# Patient Record
Sex: FEMALE | Employment: FULL TIME | URBAN - METROPOLITAN AREA
[De-identification: names, ages, dates, MRNs, and addresses within clinical notes are randomized per-mention and may not be internally consistent; named-entity substitution may affect disease eponyms.]

---

## 2021-10-28 ENCOUNTER — CLINICAL SUPPORT (OUTPATIENT)
Dept: OTHER | Facility: CLINIC | Age: 46
End: 2021-10-28
Payer: COMMERCIAL

## 2021-10-28 DIAGNOSIS — Z00.8 ENCOUNTER FOR OTHER GENERAL EXAMINATION: ICD-10-CM

## 2021-10-28 PROCEDURE — 82947 PR  ASSAY QUANTITATIVE,BLOOD GLUCOSE: ICD-10-PCS | Mod: QW,S$GLB,, | Performed by: INTERNAL MEDICINE

## 2021-10-28 PROCEDURE — 99401 PREV MED CNSL INDIV APPRX 15: CPT | Mod: 25,S$GLB,, | Performed by: INTERNAL MEDICINE

## 2021-10-28 PROCEDURE — 82947 ASSAY GLUCOSE BLOOD QUANT: CPT | Mod: QW,S$GLB,, | Performed by: INTERNAL MEDICINE

## 2021-10-28 PROCEDURE — 80061 LIPID PANEL: CPT | Mod: QW,S$GLB,, | Performed by: INTERNAL MEDICINE

## 2021-10-28 PROCEDURE — 80061 PR  LIPID PANEL: ICD-10-PCS | Mod: QW,S$GLB,, | Performed by: INTERNAL MEDICINE

## 2021-10-28 PROCEDURE — 99401 PR PREVENT COUNSEL,INDIV,15 MIN: ICD-10-PCS | Mod: 25,S$GLB,, | Performed by: INTERNAL MEDICINE

## 2021-10-29 VITALS — HEIGHT: 59 IN

## 2021-10-29 LAB
GLUCOSE SERPL-MCNC: 93 MG/DL (ref 60–140)
HDLC SERPL-MCNC: 52 MG/DL
POC CHOLESTEROL, LDL (DOCK): 104 MG/DL
POC CHOLESTEROL, TOTAL: 198 MG/DL
TRIGL SERPL-MCNC: 214 MG/DL

## 2024-07-22 ENCOUNTER — TELEPHONE (OUTPATIENT)
Dept: SURGERY | Facility: CLINIC | Age: 49
End: 2024-07-22
Payer: COMMERCIAL

## 2024-07-22 ENCOUNTER — HOSPITAL ENCOUNTER (EMERGENCY)
Facility: HOSPITAL | Age: 49
Discharge: HOME | End: 2024-07-22
Attending: EMERGENCY MEDICINE
Payer: COMMERCIAL

## 2024-07-22 VITALS
OXYGEN SATURATION: 98 % | BODY MASS INDEX: 35.18 KG/M2 | TEMPERATURE: 98 F | RESPIRATION RATE: 20 BRPM | HEART RATE: 69 BPM | WEIGHT: 174.19 LBS | SYSTOLIC BLOOD PRESSURE: 184 MMHG | DIASTOLIC BLOOD PRESSURE: 87 MMHG

## 2024-07-22 DIAGNOSIS — R10.13 EPIGASTRIC PAIN: Primary | ICD-10-CM

## 2024-07-22 DIAGNOSIS — K42.9 UMBILICAL HERNIA WITHOUT OBSTRUCTION AND WITHOUT GANGRENE: ICD-10-CM

## 2024-07-22 LAB
ALBUMIN SERPL BCP-MCNC: 4.3 G/DL (ref 3.5–5.2)
ALP SERPL-CCNC: 89 U/L (ref 55–135)
ALT SERPL W/O P-5'-P-CCNC: 35 U/L (ref 10–44)
ANION GAP SERPL CALC-SCNC: 12 MMOL/L (ref 8–16)
AST SERPL-CCNC: 27 U/L (ref 10–40)
BASOPHILS # BLD AUTO: 0.02 K/UL (ref 0–0.2)
BASOPHILS NFR BLD: 0.2 % (ref 0–1.9)
BILIRUB SERPL-MCNC: 0.2 MG/DL (ref 0.1–1)
BUN SERPL-MCNC: 13 MG/DL (ref 6–20)
CALCIUM SERPL-MCNC: 10.3 MG/DL (ref 8.7–10.5)
CHLORIDE SERPL-SCNC: 105 MMOL/L (ref 95–110)
CO2 SERPL-SCNC: 25 MMOL/L (ref 23–29)
CREAT SERPL-MCNC: 0.7 MG/DL (ref 0.5–1.4)
DIFFERENTIAL METHOD BLD: NORMAL
EOSINOPHIL # BLD AUTO: 0.1 K/UL (ref 0–0.5)
EOSINOPHIL NFR BLD: 1.3 % (ref 0–8)
ERYTHROCYTE [DISTWIDTH] IN BLOOD BY AUTOMATED COUNT: 12 % (ref 11.5–14.5)
EST. GFR  (NO RACE VARIABLE): >60 ML/MIN/1.73 M^2
GLUCOSE SERPL-MCNC: 105 MG/DL (ref 70–110)
HCT VFR BLD AUTO: 41.3 % (ref 37–48.5)
HGB BLD-MCNC: 13.6 G/DL (ref 12–16)
IMM GRANULOCYTES # BLD AUTO: 0.03 K/UL (ref 0–0.04)
IMM GRANULOCYTES NFR BLD AUTO: 0.4 % (ref 0–0.5)
LIPASE SERPL-CCNC: 49 U/L (ref 4–60)
LYMPHOCYTES # BLD AUTO: 3.1 K/UL (ref 1–4.8)
LYMPHOCYTES NFR BLD: 37.3 % (ref 18–48)
MCH RBC QN AUTO: 28.6 PG (ref 27–31)
MCHC RBC AUTO-ENTMCNC: 32.9 G/DL (ref 32–36)
MCV RBC AUTO: 87 FL (ref 82–98)
MONOCYTES # BLD AUTO: 0.6 K/UL (ref 0.3–1)
MONOCYTES NFR BLD: 7.6 % (ref 4–15)
NEUTROPHILS # BLD AUTO: 4.4 K/UL (ref 1.8–7.7)
NEUTROPHILS NFR BLD: 53.2 % (ref 38–73)
NRBC BLD-RTO: 0 /100 WBC
PLATELET # BLD AUTO: 225 K/UL (ref 150–450)
PMV BLD AUTO: 10.7 FL (ref 9.2–12.9)
POTASSIUM SERPL-SCNC: 4.4 MMOL/L (ref 3.5–5.1)
PROT SERPL-MCNC: 7.7 G/DL (ref 6–8.4)
RBC # BLD AUTO: 4.75 M/UL (ref 4–5.4)
SODIUM SERPL-SCNC: 142 MMOL/L (ref 136–145)
WBC # BLD AUTO: 8.18 K/UL (ref 3.9–12.7)

## 2024-07-22 PROCEDURE — 83690 ASSAY OF LIPASE: CPT | Performed by: NURSE PRACTITIONER

## 2024-07-22 PROCEDURE — 99285 EMERGENCY DEPT VISIT HI MDM: CPT | Mod: 25

## 2024-07-22 PROCEDURE — 85025 COMPLETE CBC W/AUTO DIFF WBC: CPT | Performed by: NURSE PRACTITIONER

## 2024-07-22 PROCEDURE — 80053 COMPREHEN METABOLIC PANEL: CPT | Performed by: NURSE PRACTITIONER

## 2024-07-22 NOTE — TELEPHONE ENCOUNTER
Contacting patient to get her scheduled. Patient had recent ER visit with findings of hiatal and umbilical hernia. Patient scheduled for 7/25/24 at 11:40am        ----- Message from Teresa Hinds sent at 7/22/2024  1:24 PM CDT -----  Patient stated she went to ER today and have a small hernia. She was advised to follow up with general surgery. Call back number is [phone]. Thx.EL

## 2024-07-22 NOTE — ED PROVIDER NOTES
Encounter Date: 7/22/2024       History     Chief Complaint   Patient presents with    Abdominal Pain     Pt states she is having abd pain in the middle of her abd. Pt states she is also feeling faint. Denies N/V/D     49-year-old female with complaint of mid abdominal pain over the past 2 weeks.  Patient reports pain has worsening.  Patient reports no diarrhea but reports nausea.  Patient denies fever or chills.        Review of patient's allergies indicates:  No Known Allergies  No past medical history on file.  No past surgical history on file.  No family history on file.     Review of Systems   Constitutional:  Negative for fever.   HENT:  Negative for sore throat.    Respiratory:  Negative for shortness of breath.    Cardiovascular:  Negative for chest pain.   Gastrointestinal:  Positive for abdominal pain. Negative for nausea.   Genitourinary:  Negative for dysuria.   Musculoskeletal:  Negative for back pain.   Skin:  Negative for rash.   Neurological:  Negative for weakness.   Hematological:  Does not bruise/bleed easily.       Physical Exam     Initial Vitals [07/22/24 0910]   BP Pulse Resp Temp SpO2   (!) 166/81 80 16 97.8 °F (36.6 °C) 98 %      MAP       --         Physical Exam    Nursing note and vitals reviewed.  Constitutional: She appears well-developed and well-nourished.   HENT:   Head: Normocephalic and atraumatic.   Eyes: Conjunctivae and EOM are normal. Pupils are equal, round, and reactive to light.   Neck: Neck supple.   Normal range of motion.  Cardiovascular:  Normal rate, regular rhythm, normal heart sounds and intact distal pulses.           Pulmonary/Chest: Breath sounds normal.   Abdominal: Abdomen is soft. There is abdominal tenderness. There is no rebound and no guarding.   Musculoskeletal:         General: Normal range of motion.      Cervical back: Normal range of motion and neck supple.     Neurological: She is alert and oriented to person, place, and time. She has normal strength  and normal reflexes.   Skin: Skin is warm and dry.   Psychiatric: She has a normal mood and affect. Her behavior is normal. Thought content normal.         ED Course   Procedures  Labs Reviewed   CBC W/ AUTO DIFFERENTIAL       Result Value    WBC 8.18      RBC 4.75      Hemoglobin 13.6      Hematocrit 41.3      MCV 87      MCH 28.6      MCHC 32.9      RDW 12.0      Platelets 225      MPV 10.7      Immature Granulocytes 0.4      Gran # (ANC) 4.4      Immature Grans (Abs) 0.03      Lymph # 3.1      Mono # 0.6      Eos # 0.1      Baso # 0.02      nRBC 0      Gran % 53.2      Lymph % 37.3      Mono % 7.6      Eosinophil % 1.3      Basophil % 0.2      Differential Method Automated     COMPREHENSIVE METABOLIC PANEL    Sodium 142      Potassium 4.4      Chloride 105      CO2 25      Glucose 105      BUN 13      Creatinine 0.7      Calcium 10.3      Total Protein 7.7      Albumin 4.3      Total Bilirubin 0.2      Alkaline Phosphatase 89      AST 27      ALT 35      eGFR >60      Anion Gap 12     LIPASE    Lipase 49     URINALYSIS, REFLEX TO URINE CULTURE          Imaging Results              CT Abdomen Pelvis  Without Contrast (Final result)  Result time 07/22/24 11:08:11      Final result by Da Wagner MD (07/22/24 11:08:11)                   Impression:      1.  Negative for acute process involving the abdomen or pelvis.  Normal appendix.  Negative for renal stone disease or hydronephrosis.  Negative for inflammatory changes.  Negative for free air.    2.  The urinary bladder is mildly distended.  Bladder outlet obstruction symptoms?  Clinical correlation is advised.    3.  Middle lobe scarring or atelectasis.  Small hiatal hernia.  Fatty infiltration of the liver.  9 mm nabothian cyst.  Fat filled umbilical hernia.  Age-appropriate degenerative changes of the spine and pelvis.  Other nonemergent findings as described above.    All CT scans at this facility are performed  using dose modulation techniques as  appropriate to performed exam including the following:  automated exposure control; adjustment of mA and/or kV according to the patients size (this includes techniques or standardized protocols for targeted exams where dose is matched to indication/reason for exam: i.e. extremities or head);  iterative reconstruction technique.      Electronically signed by: Da Wagner MD  Date:    07/22/2024  Time:    11:08               Narrative:    EXAMINATION:  CT ABDOMEN PELVIS WITHOUT CONTRAST, multiplanar reconstructions    CLINICAL HISTORY:  LLQ abdominal pain;    TECHNIQUE:  Axial images through the abdomen and pelvis were obtained without the use of IV contrast.  Sagittal and coronal reconstructions are provided for review.  Oral contrast was not utilized.    COMPARISON:  None    FINDINGS:  LUNG BASES: Middle lobe scarring or atelectasis.  Lung bases are otherwise clear.  Negative for pleural or pericardial effusions. The distal esophagus is normal.  Small hiatal hernia.  Negative for coronary artery calcifications.    ABDOMEN: Fatty infiltration of the liver with fatty sparing near the gallbladder fossa.  The liver, spleen and gallbladder otherwise appear normal.  The pancreas is unremarkable.  Kidneys and adrenal glands are normal.    Negative for adenopathy or ascites noted within the abdomen or pelvis.  Minimal amorphous inflammatory changes involve the root of the mesentery with multiple subcentimeter lymph nodes, nonspecific.  Negative for vascular abnormalities.    The bowel appears normal. Normal appendix.  Appropriate stool.  Negative for free air.    PELVIS: The urinary bladder is mildly distended.  The uterus is tilted to the left.  9 mm nabothian cyst.  The rest of the female pelvic organs are normal. There are pelvic phleboliths.    Fat filled umbilical hernia.  The abdominal wall is otherwise intact.    No significant osseous abnormality is identified.  Negative for significant spinal canal stenosis. Mild  multilevel marginal spondylosis.  Convex-left curvature of the thoracolumbar junction.  Vacuum phenomenon of the sacroiliac joints.  Mild degenerative changes of the pubic symphysis.    Negative for groin adenopathy.                                    Imaging Results              CT Abdomen Pelvis  Without Contrast (Final result)  Result time 07/22/24 11:08:11      Final result by Da Wagner MD (07/22/24 11:08:11)                   Impression:      1.  Negative for acute process involving the abdomen or pelvis.  Normal appendix.  Negative for renal stone disease or hydronephrosis.  Negative for inflammatory changes.  Negative for free air.    2.  The urinary bladder is mildly distended.  Bladder outlet obstruction symptoms?  Clinical correlation is advised.    3.  Middle lobe scarring or atelectasis.  Small hiatal hernia.  Fatty infiltration of the liver.  9 mm nabothian cyst.  Fat filled umbilical hernia.  Age-appropriate degenerative changes of the spine and pelvis.  Other nonemergent findings as described above.    All CT scans at this facility are performed  using dose modulation techniques as appropriate to performed exam including the following:  automated exposure control; adjustment of mA and/or kV according to the patients size (this includes techniques or standardized protocols for targeted exams where dose is matched to indication/reason for exam: i.e. extremities or head);  iterative reconstruction technique.      Electronically signed by: Da Wagner MD  Date:    07/22/2024  Time:    11:08               Narrative:    EXAMINATION:  CT ABDOMEN PELVIS WITHOUT CONTRAST, multiplanar reconstructions    CLINICAL HISTORY:  LLQ abdominal pain;    TECHNIQUE:  Axial images through the abdomen and pelvis were obtained without the use of IV contrast.  Sagittal and coronal reconstructions are provided for review.  Oral contrast was not utilized.    COMPARISON:  None    FINDINGS:  LUNG BASES: Middle lobe  scarring or atelectasis.  Lung bases are otherwise clear.  Negative for pleural or pericardial effusions. The distal esophagus is normal.  Small hiatal hernia.  Negative for coronary artery calcifications.    ABDOMEN: Fatty infiltration of the liver with fatty sparing near the gallbladder fossa.  The liver, spleen and gallbladder otherwise appear normal.  The pancreas is unremarkable.  Kidneys and adrenal glands are normal.    Negative for adenopathy or ascites noted within the abdomen or pelvis.  Minimal amorphous inflammatory changes involve the root of the mesentery with multiple subcentimeter lymph nodes, nonspecific.  Negative for vascular abnormalities.    The bowel appears normal. Normal appendix.  Appropriate stool.  Negative for free air.    PELVIS: The urinary bladder is mildly distended.  The uterus is tilted to the left.  9 mm nabothian cyst.  The rest of the female pelvic organs are normal. There are pelvic phleboliths.    Fat filled umbilical hernia.  The abdominal wall is otherwise intact.    No significant osseous abnormality is identified.  Negative for significant spinal canal stenosis. Mild multilevel marginal spondylosis.  Convex-left curvature of the thoracolumbar junction.  Vacuum phenomenon of the sacroiliac joints.  Mild degenerative changes of the pubic symphysis.    Negative for groin adenopathy.                                    Labs Reviewed   CBC W/ AUTO DIFFERENTIAL       Result Value    WBC 8.18      RBC 4.75      Hemoglobin 13.6      Hematocrit 41.3      MCV 87      MCH 28.6      MCHC 32.9      RDW 12.0      Platelets 225      MPV 10.7      Immature Granulocytes 0.4      Gran # (ANC) 4.4      Immature Grans (Abs) 0.03      Lymph # 3.1      Mono # 0.6      Eos # 0.1      Baso # 0.02      nRBC 0      Gran % 53.2      Lymph % 37.3      Mono % 7.6      Eosinophil % 1.3      Basophil % 0.2      Differential Method Automated     COMPREHENSIVE METABOLIC PANEL    Sodium 142      Potassium  4.4      Chloride 105      CO2 25      Glucose 105      BUN 13      Creatinine 0.7      Calcium 10.3      Total Protein 7.7      Albumin 4.3      Total Bilirubin 0.2      Alkaline Phosphatase 89      AST 27      ALT 35      eGFR >60      Anion Gap 12     LIPASE    Lipase 49     URINALYSIS, REFLEX TO URINE CULTURE          Medications - No data to display  Medical Decision Making  Amount and/or Complexity of Data Reviewed  Labs: ordered.  Radiology: ordered.       11:40 AM  Patient resting comfortably.  No abdominal tenderness on exam.  Patient told to return for any worsening.Pt denies any urinary complaints.                                Clinical Impression:  Final diagnoses:  [R10.13] Epigastric pain (Primary)  [K42.9] Umbilical hernia without obstruction and without gangrene          ED Disposition Condition    Discharge Stable          ED Prescriptions    None       Follow-up Information       Follow up With Specialties Details Why Contact Info    [facility]  Schedule an appointment as soon as possible for a visit in 2 days  [phone]             Yosi Tripathi NP  07/22/24 [id number]

## 2024-07-25 ENCOUNTER — OFFICE VISIT (OUTPATIENT)
Dept: SURGERY | Facility: CLINIC | Age: 49
End: 2024-07-25
Payer: COMMERCIAL

## 2024-07-25 VITALS
BODY MASS INDEX: 35.67 KG/M2 | HEART RATE: 77 BPM | DIASTOLIC BLOOD PRESSURE: 89 MMHG | SYSTOLIC BLOOD PRESSURE: 136 MMHG | WEIGHT: 176.56 LBS

## 2024-07-25 DIAGNOSIS — F41.9 ANXIETY: Primary | ICD-10-CM

## 2024-07-25 DIAGNOSIS — R10.9 ABDOMINAL PAIN, UNSPECIFIED ABDOMINAL LOCATION: ICD-10-CM

## 2024-07-25 PROBLEM — Z87.442 HISTORY OF KIDNEY STONES: Status: ACTIVE | Noted: 2022-02-21

## 2024-07-25 PROBLEM — E66.9 CLASS 2 OBESITY IN ADULT: Status: ACTIVE | Noted: 2024-05-10

## 2024-07-25 PROBLEM — E66.812 CLASS 2 OBESITY IN ADULT: Status: ACTIVE | Noted: 2024-05-10

## 2024-07-25 PROCEDURE — 99203 OFFICE O/P NEW LOW 30 MIN: CPT | Mod: S$GLB,,, | Performed by: SURGERY

## 2024-07-25 PROCEDURE — 3079F DIAST BP 80-89 MM HG: CPT | Mod: CPTII,S$GLB,, | Performed by: SURGERY

## 2024-07-25 PROCEDURE — 1159F MED LIST DOCD IN RCRD: CPT | Mod: CPTII,S$GLB,, | Performed by: SURGERY

## 2024-07-25 PROCEDURE — 99999 PR PBB SHADOW E&M-EST. PATIENT-LVL III: CPT | Mod: PBBFAC,,, | Performed by: SURGERY

## 2024-07-25 PROCEDURE — 3008F BODY MASS INDEX DOCD: CPT | Mod: CPTII,S$GLB,, | Performed by: SURGERY

## 2024-07-25 PROCEDURE — 3075F SYST BP GE 130 - 139MM HG: CPT | Mod: CPTII,S$GLB,, | Performed by: SURGERY

## 2024-07-25 RX ORDER — ESCITALOPRAM OXALATE 10 MG/1
1 TABLET ORAL EVERY MORNING
COMMUNITY
Start: 2024-05-10

## 2024-07-25 RX ORDER — CLONAZEPAM 0.25 MG/1
0.25 TABLET, ORALLY DISINTEGRATING ORAL 3 TIMES DAILY PRN
COMMUNITY
Start: 2024-07-24

## 2024-07-25 NOTE — PATIENT INSTRUCTIONS
I would not recommend any surgical intervention.      You can try using Prevacid over-the-counter.      You should follow up with your gastroenterologist at the [facility].      My recommendation is that you talk to your gastroenterologist about a upper endoscopy    If we can be of any further assist this please let us know      Our office phone numbers are  [phone] and

## 2024-07-25 NOTE — PROGRESS NOTES
Patient ID: Tiara Johnston is a 49 y.o. female.    Chief Complaint: Hernia (Umbilical)      HPI:  49-year-old female who started some probiotics about 2 weeks ago.  After starting the she had a painful bloated feeling of the central abdomen.  She went to the emergency room at [facility].  A CT scan was done that showed a small hiatal in his small umbilical hernia.      Patient states she still has some fullness in her central abdomen.  She was not have any significant pain.  She was not have any nausea or vomiting.  She was not notice any bulging.  She has stop the probe biotic.      She was scheduled to see her gastroenterologist at the [facility] next month.      Colonoscopy in 2023.  Pathology results are available and listed below    She was accompanied by her mother    Review of Systems   Constitutional:  Negative for appetite change, chills, fatigue, fever and unexpected weight change.   HENT:  Negative for hearing loss and rhinorrhea.    Eyes:  Negative for visual disturbance.   Respiratory:  Negative for apnea, cough, shortness of breath and wheezing.    Cardiovascular:  Negative for chest pain and palpitations.   Gastrointestinal:  Positive for abdominal distention. Negative for abdominal pain (previously), blood in stool, constipation, diarrhea, nausea and vomiting.   Genitourinary:  Negative for dysuria, frequency and urgency.   Musculoskeletal:  Negative for arthralgias and neck pain.   Skin:  Negative for rash.   Neurological:  Negative for seizures, weakness, numbness and headaches.   Hematological:  Negative for adenopathy. Does not bruise/bleed easily.   Psychiatric/Behavioral:  Negative for hallucinations. The patient is not nervous/anxious.      Current Outpatient Medications   Medication Sig Dispense Refill    clonazePAM (KLONOPIN) 0.25 MG TbDL Take 0.25 mg by mouth 3 (three) times daily as needed.      EScitalopram oxalate (LEXAPRO) 10 MG tablet Take 1 tablet by mouth  every morning.       No current facility-administered medications for this visit.       Review of patient's allergies indicates:  No Known Allergies    No past medical history on file.    No past surgical history on file.    Social History     Socioeconomic History    Marital status: Single   Tobacco Use    Smoking status: Never     Passive exposure: Never    Smokeless tobacco: Never       Vitals:    07/25/24 1132   BP: 136/89   Pulse: 77       Physical Exam  Constitutional:       Appearance: She is well-developed. She is obese.   HENT:      Head: Normocephalic.   Eyes:      Pupils: Pupils are equal, round, and reactive to light.   Neck:      Thyroid: No thyromegaly.      Vascular: No JVD.      Trachea: No tracheal deviation.   Cardiovascular:      Rate and Rhythm: Normal rate and regular rhythm.      Heart sounds: Normal heart sounds.   Pulmonary:      Breath sounds: Normal breath sounds. No wheezing.   Abdominal:      General: Bowel sounds are normal. There is no distension.      Palpations: Abdomen is soft. Abdomen is not rigid. There is no mass.      Tenderness: There is no abdominal tenderness. There is no guarding or rebound.      Comments: Obese.      No evidence of any palpable abdominal wall hernias or umbilical hernias   Musculoskeletal:         General: Normal range of motion.   Lymphadenopathy:      Cervical: No cervical adenopathy.   Skin:     General: Skin is warm and dry.      Findings: No erythema or rash.   Neurological:      Mental Status: She is oriented to person, place, and time.   Psychiatric:         Mood and Affect: Mood normal.         Behavior: Behavior normal.         Thought Content: Thought content normal.         Judgment: Judgment normal.     Pathology results from colonoscopy    [facility] - 07/18/2023 11:47 AM CDT   SEE DETAILS  Clinical Data: Screening.                                                FINAL PATHOLOGIC DIAGNOSIS      1. Transverse colon polyp,  "polypectomy:                                   - Tubular adenoma                                                     - Negative for high grade dysplasia or malignancy                    2. Ascending colon polyp, polypectomy:                                   - Sessile serrated lesion (polyp)                                     - Negative for adenomatous dysplasia or malignancy                    3. Sigmoid colon polyp, polypectomy:                                     - Tubular adenoma                                                     - Negative for high grade dysplasia or malignancy                                                        Sanjuanita Stewart M.D.                                              [facility] Pathologist, Electronic Signature      SPECIMEN(S) SUBMITTED: GROSS DESCRIPTION                                  1. Transverse colon polyp: In formalin labeled "transverse colon         polyp" is one 0.4 cm fragment of tan mucosa; entirely submitted       in 1 cassette.                                                        2. Ascending colon polyp: In formalin labeled "ascending colon           polyp" are multiple fragments of tan mucosa aggregating to 0.8         cm; entirely submitted in 1 cassette.                                3. Sigmoid colon polyp: In formalin labeled "sigmoid colon polyp"         are three fragments of tan mucosa aggregating to 1 cm; entirely       submitted in 1 cassette. BMW/cbt           CT scan results    CT Abdomen Pelvis  Without Contrast  Order: [id number]  Status: Final result       Visible to patient: Yes (seen)       Next appt: None    0 Result Notes  Details    Reading Physician Reading Date Result Priority   Da Wagner MD  [phone] 7/22/2024 STAT     Narrative & Impression  EXAMINATION:  CT ABDOMEN PELVIS WITHOUT CONTRAST, multiplanar reconstructions     CLINICAL HISTORY:  LLQ abdominal pain;     TECHNIQUE:  Axial images through the abdomen and pelvis were " obtained without the use of IV contrast.  Sagittal and coronal reconstructions are provided for review.  Oral contrast was not utilized.     COMPARISON:  None     FINDINGS:  LUNG BASES: Middle lobe scarring or atelectasis.  Lung bases are otherwise clear.  Negative for pleural or pericardial effusions. The distal esophagus is normal.  Small hiatal hernia.  Negative for coronary artery calcifications.     ABDOMEN: Fatty infiltration of the liver with fatty sparing near the gallbladder fossa.  The liver, spleen and gallbladder otherwise appear normal.  The pancreas is unremarkable.  Kidneys and adrenal glands are normal.     Negative for adenopathy or ascites noted within the abdomen or pelvis.  Minimal amorphous inflammatory changes involve the root of the mesentery with multiple subcentimeter lymph nodes, nonspecific.  Negative for vascular abnormalities.     The bowel appears normal. Normal appendix.  Appropriate stool.  Negative for free air.     PELVIS: The urinary bladder is mildly distended.  The uterus is tilted to the left.  9 mm nabothian cyst.  The rest of the female pelvic organs are normal. There are pelvic phleboliths.     Fat filled umbilical hernia.  The abdominal wall is otherwise intact.     No significant osseous abnormality is identified.  Negative for significant spinal canal stenosis. Mild multilevel marginal spondylosis.  Convex-left curvature of the thoracolumbar junction.  Vacuum phenomenon of the sacroiliac joints.  Mild degenerative changes of the pubic symphysis.     Negative for groin adenopathy.     Impression:     1.  Negative for acute process involving the abdomen or pelvis.  Normal appendix.  Negative for renal stone disease or hydronephrosis.  Negative for inflammatory changes.  Negative for free air.     2.  The urinary bladder is mildly distended.  Bladder outlet obstruction symptoms?  Clinical correlation is advised.     3.  Middle lobe scarring or atelectasis.  Small hiatal  hernia.  Fatty infiltration of the liver.  9 mm nabothian cyst.  Fat filled umbilical hernia.  Age-appropriate degenerative changes of the spine and pelvis.  Other nonemergent findings as described above.     All CT scans at this facility are performed  using dose modulation techniques as appropriate to performed exam including the following:  automated exposure control; adjustment of mA and/or kV according to the patients size (this includes techniques or standardized protocols for targeted exams where dose is matched to indication/reason for exam: i.e. extremities or head);  iterative reconstruction technique.        Electronically signed by:Da Wagner MD  Date:                                            07/22/2024  Time:                                           11:08                                 Assessment & Plan:    Umbilical hernia.  This is not clinically relevant at it was nonpalpable and I do not believe is the cause of her pain   Hiatal hernia.  This is very small.  It was possible that she has gastroesophageal reflux disease    Upon review of the CT scan there is no need for surgical intervention      Patient should follow up with her gastroenterologist that the [facility] with consideration given to upper endoscopy.      She was instructed to returned to the emergency room if she has an increasing her pain.  If she does returned to the emergency room a CT scan should be repeated with IV and oral contrast.      Continued follow up in care through her providers at the [facility]

## 2024-07-25 NOTE — LETTER
July 25, 2024        Rip Arango MD  [address]  [place] [address]             [facility] [address]  [address] [address] [address]  Phone: [phone]  Fax: [phone]   Patient: Tiara Johnston   MR Number: [id number]   YOB: 1975   Date of Visit: 7/25/2024       Dear Dr. Arango:    Thank you for referring Tiara Johnston to me for evaluation. Attached you will find relevant portions of my assessment and plan of care.    If you have questions, please do not hesitate to call me. I look forward to following Tiara Johnston along with you.    The patient was seen at [facility].  The findings on her CT scan are likely not the cause of her abdominal pain.  No surgical intervention was recommended.      She should follow up with her gastroenterologist with the [facility] with consideration being given to upper endoscopy    Sincerely,          Po Barth MD              CC    No Recipients    Enclosure

## 2025-05-18 ENCOUNTER — HOSPITAL ENCOUNTER (EMERGENCY)
Facility: HOSPITAL | Age: 50
Discharge: HOME | End: 2025-05-18
Attending: EMERGENCY MEDICINE
Payer: COMMERCIAL

## 2025-05-18 VITALS
WEIGHT: 170.63 LBS | TEMPERATURE: 99 F | BODY MASS INDEX: 34.4 KG/M2 | RESPIRATION RATE: 21 BRPM | HEIGHT: 59 IN | SYSTOLIC BLOOD PRESSURE: 119 MMHG | DIASTOLIC BLOOD PRESSURE: 57 MMHG | HEART RATE: 69 BPM | OXYGEN SATURATION: 99 %

## 2025-05-18 DIAGNOSIS — N30.00 ACUTE CYSTITIS WITHOUT HEMATURIA: Primary | ICD-10-CM

## 2025-05-18 DIAGNOSIS — K76.0 HEPATIC STEATOSIS: ICD-10-CM

## 2025-05-18 LAB
ABSOLUTE EOSINOPHIL (OHS): 0.11 K/UL
ABSOLUTE MONOCYTE (OHS): 0.58 K/UL (ref 0.3–1)
ABSOLUTE NEUTROPHIL COUNT (OHS): 4.14 K/UL (ref 1.8–7.7)
ALBUMIN SERPL BCP-MCNC: 4 G/DL (ref 3.5–5.2)
ALP SERPL-CCNC: 77 UNIT/L (ref 40–150)
ALT SERPL W/O P-5'-P-CCNC: 27 UNIT/L (ref 10–44)
ANION GAP (OHS): 10 MMOL/L (ref 8–16)
AST SERPL-CCNC: 21 UNIT/L (ref 11–45)
B-HCG UR QL: NEGATIVE
BASOPHILS # BLD AUTO: 0.02 K/UL
BASOPHILS NFR BLD AUTO: 0.3 %
BILIRUB SERPL-MCNC: 0.2 MG/DL (ref 0.1–1)
BILIRUB UR QL STRIP.AUTO: NEGATIVE
BUN SERPL-MCNC: 15 MG/DL (ref 6–20)
CALCIUM SERPL-MCNC: 9.6 MG/DL (ref 8.7–10.5)
CHLORIDE SERPL-SCNC: 107 MMOL/L (ref 95–110)
CLARITY UR: CLEAR
CO2 SERPL-SCNC: 24 MMOL/L (ref 23–29)
COLOR UR AUTO: YELLOW
CREAT SERPL-MCNC: 0.7 MG/DL (ref 0.5–1.4)
ERYTHROCYTE [DISTWIDTH] IN BLOOD BY AUTOMATED COUNT: 11.9 % (ref 11.5–14.5)
GFR SERPLBLD CREATININE-BSD FMLA CKD-EPI: >60 ML/MIN/1.73/M2
GLUCOSE SERPL-MCNC: 122 MG/DL (ref 70–110)
GLUCOSE UR QL STRIP: NEGATIVE
HCT VFR BLD AUTO: 37.3 % (ref 37–48.5)
HCV AB SERPL QL IA: NEGATIVE
HGB BLD-MCNC: 12.4 GM/DL (ref 12–16)
HGB UR QL STRIP: NEGATIVE
HIV 1+2 AB+HIV1 P24 AG SERPL QL IA: NEGATIVE
HOLD SPECIMEN: NORMAL
HYALINE CASTS UR QL AUTO: 1 /LPF (ref 0–1)
IMM GRANULOCYTES # BLD AUTO: 0.04 K/UL (ref 0–0.04)
IMM GRANULOCYTES NFR BLD AUTO: 0.5 % (ref 0–0.5)
KETONES UR QL STRIP: NEGATIVE
LEUKOCYTE ESTERASE UR QL STRIP: ABNORMAL
LIPASE SERPL-CCNC: 43 U/L (ref 4–60)
LYMPHOCYTES # BLD AUTO: 2.94 K/UL (ref 1–4.8)
MCH RBC QN AUTO: 28.8 PG (ref 27–31)
MCHC RBC AUTO-ENTMCNC: 33.2 G/DL (ref 32–36)
MCV RBC AUTO: 87 FL (ref 82–98)
MICROSCOPIC COMMENT: ABNORMAL
NITRITE UR QL STRIP: NEGATIVE
NUCLEATED RBC (/100WBC) (OHS): 0 /100 WBC
PH UR STRIP: 7 [PH]
PLATELET # BLD AUTO: 217 K/UL (ref 150–450)
PMV BLD AUTO: 10.6 FL (ref 9.2–12.9)
POTASSIUM SERPL-SCNC: 4 MMOL/L (ref 3.5–5.1)
PROT SERPL-MCNC: 7.2 GM/DL (ref 6–8.4)
PROT UR QL STRIP: NEGATIVE
RBC # BLD AUTO: 4.3 M/UL (ref 4–5.4)
RELATIVE EOSINOPHIL (OHS): 1.4 %
RELATIVE LYMPHOCYTE (OHS): 37.5 % (ref 18–48)
RELATIVE MONOCYTE (OHS): 7.4 % (ref 4–15)
RELATIVE NEUTROPHIL (OHS): 52.9 % (ref 38–73)
SODIUM SERPL-SCNC: 141 MMOL/L (ref 136–145)
SP GR UR STRIP: 1
SQUAMOUS #/AREA URNS AUTO: 1 /HPF
UROBILINOGEN UR STRIP-ACNC: NEGATIVE EU/DL
WBC # BLD AUTO: 7.83 K/UL (ref 3.9–12.7)
WBC #/AREA URNS AUTO: 18 /HPF (ref 0–5)
WBC CLUMPS UR QL AUTO: ABNORMAL

## 2025-05-18 PROCEDURE — 96360 HYDRATION IV INFUSION INIT: CPT

## 2025-05-18 PROCEDURE — 80053 COMPREHEN METABOLIC PANEL: CPT | Performed by: EMERGENCY MEDICINE

## 2025-05-18 PROCEDURE — 87389 HIV-1 AG W/HIV-1&-2 AB AG IA: CPT | Performed by: EMERGENCY MEDICINE

## 2025-05-18 PROCEDURE — 86803 HEPATITIS C AB TEST: CPT | Performed by: EMERGENCY MEDICINE

## 2025-05-18 PROCEDURE — 99284 EMERGENCY DEPT VISIT MOD MDM: CPT | Mod: 25

## 2025-05-18 PROCEDURE — 81003 URINALYSIS AUTO W/O SCOPE: CPT | Performed by: EMERGENCY MEDICINE

## 2025-05-18 PROCEDURE — 81025 URINE PREGNANCY TEST: CPT | Performed by: EMERGENCY MEDICINE

## 2025-05-18 PROCEDURE — 87086 URINE CULTURE/COLONY COUNT: CPT | Performed by: EMERGENCY MEDICINE

## 2025-05-18 PROCEDURE — 83690 ASSAY OF LIPASE: CPT | Performed by: EMERGENCY MEDICINE

## 2025-05-18 PROCEDURE — 25000003 PHARM REV CODE 250: Performed by: EMERGENCY MEDICINE

## 2025-05-18 PROCEDURE — 85025 COMPLETE CBC W/AUTO DIFF WBC: CPT | Performed by: EMERGENCY MEDICINE

## 2025-05-18 RX ORDER — CEFUROXIME AXETIL 500 MG/1
500 TABLET ORAL 2 TIMES DAILY
Qty: 14 TABLET | Refills: 0 | Status: SHIPPED | OUTPATIENT
Start: 2025-05-18 | End: 2025-05-25

## 2025-05-18 RX ADMIN — SODIUM CHLORIDE 1000 ML: 9 INJECTION, SOLUTION INTRAVENOUS at 10:05

## 2025-05-18 NOTE — ED PROVIDER NOTES
"SCRIBE #1 NOTE: I, Rosemary Sparks, am scribing for, and in the presence of, Annelise Arriaza MD. I have scribed the entire note.       History     Chief Complaint   Patient presents with    Weakness     Pt c/o generalized weakness since Thursday and mid abdominal pain and nausea since Friday. States, "I woke up this morning and felt like I was going to pass out." Denies fever, v/d, CP, SOB, dysuria.      Review of patient's allergies indicates:  No Known Allergies      History of Present Illness     HPI    5/18/2025, 10:34 AM  History obtained from the patient and medical records      History of Present Illness: Tiara Johnston is a 50 y.o. female patient with a PMHx of kidney stones, anxiety, and obesity who presents to the Emergency Department for evaluation of weakness which began Thursday. Pt states she has been feeling "woozy" and "faint". She went to  Friday and was told she has leukocytes present. Symptoms are constant and moderate in severity. No mitigating or exacerbating factors reported. Associated sxs include right-sided abdominal pain, and nausea. Patient denies any appetite change, dysuria, hematuria, decrease in urine output, or fever. No prior Tx specified.  No further complaints or concerns at this time.       Arrival mode: Personal Transportation    PCP: Rip Arango MD        Past Medical History:  No past medical history on file.    Past Surgical History:  No past surgical history on file.      Family History:  No family history on file.    Social History:  Social History     Tobacco Use    Smoking status: Never     Passive exposure: Never    Smokeless tobacco: Never   Substance and Sexual Activity    Alcohol use: Not on file    Drug use: Not on file    Sexual activity: Not on file        Review of Systems     Review of Systems   Constitutional:  Negative for appetite change and fever.   HENT:  Negative for sore throat.    Respiratory:  Negative for shortness of breath.  " "  Cardiovascular:  Negative for chest pain.   Gastrointestinal:  Positive for abdominal pain (Right-sided) and nausea.   Genitourinary:  Negative for decreased urine volume, dysuria and hematuria.   Musculoskeletal:  Negative for back pain.   Skin:  Negative for rash.   Neurological:  Positive for weakness.   Hematological:  Does not bruise/bleed easily.   All other systems reviewed and are negative.     Physical Exam     Initial Vitals [05/18/25 1005]   BP Pulse Resp Temp SpO2   128/80 83 20 98.7 °F (37.1 °C) 98 %      MAP       --          Physical Exam  Nursing Notes and Vital Signs Reviewed.  Constitutional: Patient is in no acute distress. Well-developed and well-nourished.  Head: Atraumatic. Normocephalic.  Eyes: PERRL. EOM intact. Conjunctivae are not pale. No scleral icterus.  ENT: Mucous membranes are moist. Oropharynx is clear and symmetric.    Neck: Supple. Full ROM. No lymphadenopathy.  Cardiovascular: Regular rate. Regular rhythm. No murmurs, rubs, or gallops. Distal pulses are 2+ and symmetric.  Pulmonary/Chest: No respiratory distress. Clear to auscultation bilaterally. No wheezing or rales.  Abdominal: Soft and non-distended.  There is RUQ tenderness.  No rebound, guarding, or rigidity. Good bowel sounds.  Genitourinary: No CVA tenderness.  Musculoskeletal: Moves all extremities. No obvious deformities. No edema. No calf tenderness.  Skin: Warm and dry.  Neurological:  Alert, awake, and appropriate.  Normal speech.  No acute focal neurological deficits are appreciated.  Psychiatric: Normal affect. Good eye contact. Appropriate in content.     ED Course   Procedures  ED Vital Signs:  Vitals:    05/18/25 1005 05/18/25 1030 05/18/25 1100 05/18/25 1130   BP: 128/80 129/79 123/84 116/65   Pulse: 83 81 64 78   Resp: 20 18 20 18   Temp: 98.7 °F (37.1 °C)      TempSrc: Oral      SpO2: 98% 99% 98% 99%   Weight: 77.4 kg (170 lb 9.6 oz)      Height: 4' 11" (1.499 m)          Abnormal Lab Results:  Labs Reviewed "   COMPREHENSIVE METABOLIC PANEL - Abnormal       Result Value    Sodium 141      Potassium 4.0      Chloride 107      CO2 24      Glucose 122 (*)     BUN 15      Creatinine 0.7      Calcium 9.6      Protein Total 7.2      Albumin 4.0      Bilirubin Total 0.2      ALP 77      AST 21      ALT 27      Anion Gap 10      eGFR >60     URINALYSIS, REFLEX TO URINE CULTURE - Abnormal    Color, UA Yellow      Appearance, UA Clear      pH, UA 7.0      Spec Grav UA 1.005      Protein, UA Negative      Glucose, UA Negative      Ketones, UA Negative      Bilirubin, UA Negative      Blood, UA Negative      Nitrites, UA Negative      Urobilinogen, UA Negative      Leukocyte Esterase, UA 2+ (*)    URINALYSIS MICROSCOPIC - Abnormal    WBC, UA 18 (*)     WBC Clumps, UA Occasional (*)     Squamous Epithelial Cells, UA 1      Hyaline Casts, UA 1      Microscopic Comment       LIPASE - Normal    Lipase Level 43     PREGNANCY TEST, URINE RAPID - Normal    hCG Qualitative, Urine Negative     CBC WITH DIFFERENTIAL - Normal    WBC 7.83      RBC 4.30      HGB 12.4      HCT 37.3      MCV 87      MCH 28.8      MCHC 33.2      RDW 11.9      Platelet Count 217      MPV 10.6      Nucleated RBC 0      Neut % 52.9      Lymph % 37.5      Mono % 7.4      Eos % 1.4      Basophil % 0.3      Imm Grans % 0.5      Neut # 4.14      Lymph # 2.94      Mono # 0.58      Eos # 0.11      Baso # 0.02      Imm Grans # 0.04     HEPATITIS C ANTIBODY - Normal    Hep C Ab Interp Negative     HIV 1 / 2 ANTIBODY - Normal    HIV 1/2 Ag/Ab Negative     CULTURE, URINE   CBC W/ AUTO DIFFERENTIAL    Narrative:     The following orders were created for panel order CBC W/ AUTO DIFFERENTIAL.  Procedure                               Abnormality         Status                     ---------                               -----------         ------                     CBC with Differential[[id number]]       Normal              Final result                 Please view results for these  tests on the individual orders.   HEP C VIRUS HOLD SPECIMEN    Extra Tube Hold for add-ons.     GREY TOP URINE HOLD        All Lab Results:  Results for orders placed or performed during the hospital encounter of 05/18/25   Comp. Metabolic Panel    Collection Time: 05/18/25 10:54 AM   Result Value Ref Range    Sodium 141 136 - 145 mmol/L    Potassium 4.0 3.5 - 5.1 mmol/L    Chloride 107 95 - 110 mmol/L    CO2 24 23 - 29 mmol/L    Glucose 122 (H) 70 - 110 mg/dL    BUN 15 6 - 20 mg/dL    Creatinine 0.7 0.5 - 1.4 mg/dL    Calcium 9.6 8.7 - 10.5 mg/dL    Protein Total 7.2 6.0 - 8.4 gm/dL    Albumin 4.0 3.5 - 5.2 g/dL    Bilirubin Total 0.2 0.1 - 1.0 mg/dL    ALP 77 40 - 150 unit/L    AST 21 11 - 45 unit/L    ALT 27 10 - 44 unit/L    Anion Gap 10 8 - 16 mmol/L    eGFR >60 >60 mL/min/1.73/m2   Lipase    Collection Time: 05/18/25 10:54 AM   Result Value Ref Range    Lipase Level 43 4 - 60 U/L   CBC with Differential    Collection Time: 05/18/25 10:54 AM   Result Value Ref Range    WBC 7.83 3.90 - 12.70 K/uL    RBC 4.30 4.00 - 5.40 M/uL    HGB 12.4 12.0 - 16.0 gm/dL    HCT 37.3 37.0 - 48.5 %    MCV 87 82 - 98 fL    MCH 28.8 27.0 - 31.0 pg    MCHC 33.2 32.0 - 36.0 g/dL    RDW 11.9 11.5 - 14.5 %    Platelet Count 217 150 - 450 K/uL    MPV 10.6 9.2 - 12.9 fL    Nucleated RBC 0 <=0 /100 WBC    Neut % 52.9 38 - 73 %    Lymph % 37.5 18 - 48 %    Mono % 7.4 4 - 15 %    Eos % 1.4 <=8 %    Basophil % 0.3 <=1.9 %    Imm Grans % 0.5 0.0 - 0.5 %    Neut # 4.14 1.8 - 7.7 K/uL    Lymph # 2.94 1 - 4.8 K/uL    Mono # 0.58 0.3 - 1 K/uL    Eos # 0.11 <=0.5 K/uL    Baso # 0.02 <=0.2 K/uL    Imm Grans # 0.04 0.00 - 0.04 K/uL   Hepatitis C Antibody    Collection Time: 05/18/25 10:55 AM   Result Value Ref Range    Hep C Ab Interp Negative Negative   HCV Virus Hold Specimen    Collection Time: 05/18/25 10:55 AM   Result Value Ref Range    Extra Tube Hold for add-ons.    HIV 1/2 Ag/Ab (4th Gen)    Collection Time: 05/18/25 10:55 AM   Result Value  Ref Range    HIV 1/2 Ag/Ab Negative Negative   Urinalysis, Reflex to Urine Culture Urine, Clean Catch    Collection Time: 05/18/25 12:07 PM    Specimen: Urine   Result Value Ref Range    Color, UA Yellow Straw, Leonor, Yellow, Light-Orange    Appearance, UA Clear Clear    pH, UA 7.0 5.0 - 8.0    Spec Grav UA 1.005 1.005 - 1.030    Protein, UA Negative Negative    Glucose, UA Negative Negative    Ketones, UA Negative Negative    Bilirubin, UA Negative Negative    Blood, UA Negative Negative    Nitrites, UA Negative Negative    Urobilinogen, UA Negative <2.0 EU/dL    Leukocyte Esterase, UA 2+ (A) Negative   Pregnancy, urine rapid    Collection Time: 05/18/25 12:07 PM   Result Value Ref Range    hCG Qualitative, Urine Negative Negative   Urinalysis Microscopic    Collection Time: 05/18/25 12:07 PM   Result Value Ref Range    WBC, UA 18 (H) 0 - 5 /HPF    WBC Clumps, UA Occasional (A) None, Rare    Squamous Epithelial Cells, UA 1 /HPF    Hyaline Casts, UA 1 0 - 1 /LPF    Microscopic Comment         Imaging Results:  Imaging Results              US Abdomen Limited (Final result)  Result time 05/18/25 11:27:40      Final result by Ryan Sy MD (05/18/25 11:27:40)                   Impression:     Hepatomegaly and generalized hepatic steatosis.    Finalized on: 5/18/2025 11:27 AM By:  Ryan Sy  [facility]# [id number]      2025-05-18 11:29:[id number]     [facility]               Narrative:    EXAM:  US ABDOMEN LIMITED    CLINICAL HISTORY:  Right upper quadrant pain    COMPARISON: None    FINDINGS:  Standard sonographic evaluation of the right upper quadrant was performed.  Pancreatic head and body appear normal.  The liver is enlarged measuring 18.8 cm.  Increased echogenicity of liver parenchyma is also visible consistent with generalized hepatic steatosis.  No focal parenchymal abnormalities or evidence of intrahepatic biliary distention is visible.  The gallbladder appears normal.  Normal antegrade blood flow is  visible in the portal vein.  The common duct measures 4 mm in diameter.  No free fluid is visible in the upper abdomen.  The right kidney measures 11.9 cm. No calcification, hydronephrosis or focal parenchymal abnormalities are appreciated.                                         No EKG ordered.           The Emergency Provider reviewed the vital signs and test results, which are outlined above.     ED Discussion     12:27 PM: Reassessed pt at this time. Discussed with patient and/or family/caretaker all pertinent ED information and results. Discussed pt dx and plan of tx. Gave the patient all f/u and return to the ED instructions. All questions and concerns were addressed at this time. Patient and/or family/caretaker expresses understanding of information and instructions, and is comfortable with plan to discharge. Pt is stable for discharge.     I discussed with patient and/or family/caretaker that evaluation in the ED does not suggest any emergent or life threatening medical conditions requiring immediate intervention beyond what was provided in the ED, and I believe patient is safe for discharge.  Regardless, an unremarkable evaluation in the ED does not preclude the development or presence of a serious of life threatening condition. As such, I instructed that the patient is to return immediately for any worsening or change in current symptoms.       Medical Decision Making  DDX: 1. Biliary colic 2. Gastritis 3. Pancreatitis    Lab work reviewed otherwise normal, mild UTI noted, US GB shows hepatic steatosis, iv fluids given, VSS, overall appears stable for discharge.     Amount and/or Complexity of Data Reviewed  Labs: ordered. Decision-making details documented in ED Course.  Radiology: ordered. Decision-making details documented in ED Course.    Risk  Prescription drug management.                ED Medication(s):  Medications   sodium chloride 0.9% bolus 1,000 mL 1,000 mL (1,000 mLs Intravenous New Bag  5/18/25 1056)       New Prescriptions    CEFUROXIME (CEFTIN) 500 MG TABLET    Take 1 tablet (500 mg total) by mouth 2 (two) times daily. for 7 days        Follow-up Information       Rip Arango MD. Schedule an appointment as soon as possible for a visit in 2 days.    Specialty: Internal Medicine  Why: Return to the Emergency Room, If symptoms worsen  Contact information:  [address] [facility] [address]  [phone]                                 Scribe Attestation:   Scribe #1: I performed the above scribed service and the documentation accurately describes the services I performed. I attest to the accuracy of the note.     Attending:   Physician Attestation Statement for Scribe #1: I, Annelise Arriaza MD, personally performed the services described in this documentation, as scribed by Rosemary Sparks, in my presence, and it is both accurate and complete.           Clinical Impression       ICD-10-CM ICD-9-CM   1. Acute cystitis without hematuria  N30.00 595.0   2. Hepatic steatosis  K76.0 571.8       Disposition:   Disposition: Discharged  Condition: Stable        Annelise Arriaza MD  05/26/25 1058

## 2025-05-20 LAB — BACTERIA UR CULT: NORMAL

## 2025-08-14 ENCOUNTER — HOSPITAL ENCOUNTER (EMERGENCY)
Facility: HOSPITAL | Age: 50
Discharge: HOME | End: 2025-08-14
Attending: EMERGENCY MEDICINE
Payer: COMMERCIAL

## 2025-08-14 VITALS
BODY MASS INDEX: 33.25 KG/M2 | DIASTOLIC BLOOD PRESSURE: 65 MMHG | HEIGHT: 60 IN | HEART RATE: 70 BPM | WEIGHT: 169.38 LBS | TEMPERATURE: 98 F | SYSTOLIC BLOOD PRESSURE: 138 MMHG | OXYGEN SATURATION: 97 % | RESPIRATION RATE: 18 BRPM

## 2025-08-14 DIAGNOSIS — R10.13 EPIGASTRIC ABDOMINAL PAIN: Primary | ICD-10-CM

## 2025-08-14 LAB
ABSOLUTE EOSINOPHIL (OHS): 0.1 K/UL
ABSOLUTE MONOCYTE (OHS): 0.5 K/UL (ref 0.3–1)
ABSOLUTE NEUTROPHIL COUNT (OHS): 4.17 K/UL (ref 1.8–7.7)
ALBUMIN SERPL BCP-MCNC: 4.2 G/DL (ref 3.5–5.2)
ALP SERPL-CCNC: 79 UNIT/L (ref 40–150)
ALT SERPL W/O P-5'-P-CCNC: 19 UNIT/L (ref 10–44)
ANION GAP (OHS): 7 MMOL/L (ref 8–16)
AST SERPL-CCNC: 27 UNIT/L (ref 11–45)
BASOPHILS # BLD AUTO: 0.02 K/UL
BASOPHILS NFR BLD AUTO: 0.3 %
BILIRUB SERPL-MCNC: 0.3 MG/DL (ref 0.1–1)
BILIRUB UR QL STRIP.AUTO: NEGATIVE
BUN SERPL-MCNC: 15 MG/DL (ref 6–20)
CALCIUM SERPL-MCNC: 9.5 MG/DL (ref 8.7–10.5)
CHLORIDE SERPL-SCNC: 107 MMOL/L (ref 95–110)
CLARITY UR: CLEAR
CO2 SERPL-SCNC: 27 MMOL/L (ref 23–29)
COLOR UR AUTO: YELLOW
CREAT SERPL-MCNC: 0.7 MG/DL (ref 0.5–1.4)
ERYTHROCYTE [DISTWIDTH] IN BLOOD BY AUTOMATED COUNT: 11.9 % (ref 11.5–14.5)
GFR SERPLBLD CREATININE-BSD FMLA CKD-EPI: >60 ML/MIN/1.73/M2
GLUCOSE SERPL-MCNC: 96 MG/DL (ref 70–110)
GLUCOSE UR QL STRIP: NEGATIVE
HCT VFR BLD AUTO: 38.7 % (ref 37–48.5)
HGB BLD-MCNC: 12.8 GM/DL (ref 12–16)
HGB UR QL STRIP: NEGATIVE
IMM GRANULOCYTES # BLD AUTO: 0.01 K/UL (ref 0–0.04)
IMM GRANULOCYTES NFR BLD AUTO: 0.1 % (ref 0–0.5)
KETONES UR QL STRIP: NEGATIVE
LEUKOCYTE ESTERASE UR QL STRIP: NEGATIVE
LIPASE SERPL-CCNC: 37 U/L (ref 4–60)
LYMPHOCYTES # BLD AUTO: 2.76 K/UL (ref 1–4.8)
MCH RBC QN AUTO: 29 PG (ref 27–31)
MCHC RBC AUTO-ENTMCNC: 33.1 G/DL (ref 32–36)
MCV RBC AUTO: 88 FL (ref 82–98)
NITRITE UR QL STRIP: NEGATIVE
NUCLEATED RBC (/100WBC) (OHS): 0 /100 WBC
PH UR STRIP: 7 [PH]
PLATELET # BLD AUTO: 212 K/UL (ref 150–450)
PMV BLD AUTO: 10.4 FL (ref 9.2–12.9)
POTASSIUM SERPL-SCNC: 4.1 MMOL/L (ref 3.5–5.1)
PROT SERPL-MCNC: 7 GM/DL (ref 6–8.4)
PROT UR QL STRIP: NEGATIVE
RBC # BLD AUTO: 4.41 M/UL (ref 4–5.4)
RELATIVE EOSINOPHIL (OHS): 1.3 %
RELATIVE LYMPHOCYTE (OHS): 36.5 % (ref 18–48)
RELATIVE MONOCYTE (OHS): 6.6 % (ref 4–15)
RELATIVE NEUTROPHIL (OHS): 55.2 % (ref 38–73)
SODIUM SERPL-SCNC: 141 MMOL/L (ref 136–145)
SP GR UR STRIP: 1.01
UROBILINOGEN UR STRIP-ACNC: NEGATIVE EU/DL
WBC # BLD AUTO: 7.56 K/UL (ref 3.9–12.7)

## 2025-08-14 PROCEDURE — 85025 COMPLETE CBC W/AUTO DIFF WBC: CPT | Performed by: PHYSICIAN ASSISTANT

## 2025-08-14 PROCEDURE — 80053 COMPREHEN METABOLIC PANEL: CPT | Performed by: PHYSICIAN ASSISTANT

## 2025-08-14 PROCEDURE — 83690 ASSAY OF LIPASE: CPT | Performed by: PHYSICIAN ASSISTANT

## 2025-08-14 PROCEDURE — 99283 EMERGENCY DEPT VISIT LOW MDM: CPT

## 2025-08-14 PROCEDURE — 81003 URINALYSIS AUTO W/O SCOPE: CPT | Performed by: PHYSICIAN ASSISTANT

## 2025-08-14 PROCEDURE — 25000003 PHARM REV CODE 250: Performed by: PHYSICIAN ASSISTANT

## 2025-08-14 RX ORDER — LIDOCAINE HYDROCHLORIDE 20 MG/ML
15 SOLUTION OROPHARYNGEAL ONCE
Status: COMPLETED | OUTPATIENT
Start: 2025-08-14 | End: 2025-08-14

## 2025-08-14 RX ORDER — ALUMINUM HYDROXIDE, MAGNESIUM HYDROXIDE, AND SIMETHICONE 1200; 120; 1200 MG/30ML; MG/30ML; MG/30ML
30 SUSPENSION ORAL ONCE
Status: COMPLETED | OUTPATIENT
Start: 2025-08-14 | End: 2025-08-14

## 2025-08-14 RX ORDER — OMEPRAZOLE 40 MG/1
40 CAPSULE, DELAYED RELEASE ORAL DAILY
Qty: 30 CAPSULE | Refills: 0 | Status: SHIPPED | OUTPATIENT
Start: 2025-08-14 | End: 2026-08-14

## 2025-08-14 RX ADMIN — LIDOCAINE HYDROCHLORIDE 15 ML: 20 SOLUTION ORAL at 09:08

## 2025-08-14 RX ADMIN — ALUMINUM HYDROXIDE, MAGNESIUM HYDROXIDE, AND SIMETHICONE 30 ML: 200; 200; 20 SUSPENSION ORAL at 09:08

## 2025-08-15 LAB — HOLD SPECIMEN: NORMAL
